# Patient Record
Sex: FEMALE | Race: OTHER | Employment: UNEMPLOYED | URBAN - METROPOLITAN AREA
[De-identification: names, ages, dates, MRNs, and addresses within clinical notes are randomized per-mention and may not be internally consistent; named-entity substitution may affect disease eponyms.]

---

## 2018-01-13 ENCOUNTER — HOSPITAL ENCOUNTER (OUTPATIENT)
Dept: MAMMOGRAPHY | Age: 43
Discharge: HOME | End: 2018-01-13
Attending: OBSTETRICS & GYNECOLOGY
Payer: COMMERCIAL

## 2018-01-13 DIAGNOSIS — Z12.39 SCREENING FOR BREAST CANCER: ICD-10-CM

## 2018-01-13 PROCEDURE — 77067 SCR MAMMO BI INCL CAD: CPT

## 2018-01-31 ENCOUNTER — HOSPITAL ENCOUNTER (OUTPATIENT)
Dept: MAMMOGRAPHY | Age: 43
Discharge: HOME | End: 2018-01-31
Attending: OBSTETRICS & GYNECOLOGY
Payer: COMMERCIAL

## 2018-01-31 DIAGNOSIS — R92.8 ABNORMAL SCREENING MAMMOGRAM: ICD-10-CM

## 2018-01-31 PROCEDURE — 77065 DX MAMMO INCL CAD UNI: CPT

## 2019-04-25 ENCOUNTER — HOSPITAL ENCOUNTER (OUTPATIENT)
Dept: MAMMOGRAPHY | Age: 44
Discharge: HOME | End: 2019-04-25
Attending: OBSTETRICS & GYNECOLOGY

## 2019-04-25 DIAGNOSIS — Z12.31 VISIT FOR SCREENING MAMMOGRAM: ICD-10-CM

## 2020-08-24 ENCOUNTER — HOSPITAL ENCOUNTER (OUTPATIENT)
Dept: MAMMOGRAPHY | Age: 45
Discharge: HOME | End: 2020-08-24
Attending: OBSTETRICS & GYNECOLOGY

## 2020-08-24 DIAGNOSIS — Z12.31 VISIT FOR SCREENING MAMMOGRAM: ICD-10-CM

## 2020-08-24 PROCEDURE — 77067 SCR MAMMO BI INCL CAD: CPT

## 2021-10-06 ENCOUNTER — TRANSCRIBE ORDER (OUTPATIENT)
Dept: SCHEDULING | Age: 46
End: 2021-10-06

## 2021-10-06 DIAGNOSIS — Z12.31 SCREENING MAMMOGRAM FOR HIGH-RISK PATIENT: Primary | ICD-10-CM

## 2021-11-09 ENCOUNTER — HOSPITAL ENCOUNTER (OUTPATIENT)
Dept: MAMMOGRAPHY | Age: 46
Discharge: HOME | End: 2021-11-09
Attending: OBSTETRICS & GYNECOLOGY

## 2021-11-09 DIAGNOSIS — Z12.31 SCREENING MAMMOGRAM FOR HIGH-RISK PATIENT: ICD-10-CM

## 2021-11-09 PROCEDURE — 77067 SCR MAMMO BI INCL CAD: CPT

## 2022-10-26 LAB
AVERAGE GLUCOSE: NORMAL
HBA1C MFR BLD: 5.4 %

## 2022-11-29 NOTE — PROGRESS NOTES
Marj Campo (:  1975) is a 50 y.o. female,New patient, here for evaluation of the following chief complaint(s):  New Patient (Patient wants discuss most recent labs results that was done on 10/26/2022./ Skin peeling on her right side of her head for about a month.)         ASSESSMENT/PLAN:  1. Hx of Hashimoto thyroiditis  -     TSH with Reflex; Future  -     CBC with Auto Differential; Future  2. Seborrhea  -     ketoconazole (NIZORAL) 2 % shampoo; Apply topically daily as needed. , Disp-1 each, R-3, Normal  3. Vitamin D deficiency  -     vitamin D (ERGOCALCIFEROL) 1.25 MG (25143 UT) CAPS capsule; Take 1 capsule by mouth once a week, Disp-12 capsule, R-3Normal  -     Vitamin D 25 Hydroxy; Future  1. Hashimoto's thyroiditis. Thyroid functions normal.  Will refer to endocrinologist once patient has insurance. 2.  Seborrhea will place on HydroVal shampoo. Advised head and shoulder shampoo. 3.  Vitamin D deficiency we will do replacement. Return in about 3 months (around 3/7/2023) for ffup with labs prior to visit. Subjective   SUBJECTIVE/OBJECTIVE:  67-year-old  female comes in to establish. Menses monthly x 5 days. LMP: 20 still with menses. Patient had lab work done in an outside facility which was reviewed. Medical illnesses include  1. History of Hashimoto's thyroiditis. Elevated TPO and thyroglobulin antibodies. Thyroid functions are normal.  2.  Complains of scaling in the front of her hairline. Has not used any antidandruff shampoos. Advised using head and shoulders shampoo. Also given prescription for ketoconazole shampoo. 3.  Lab work had shown vitamin D deficiency. We will do replacement and recheck in 3 months. 4.  Lab work 10/26/2022 hemoglobin A1c normal CBC normal CMP normal TSH normal CRP normal TPO and thyroglobulin antibodies positive      Review of Systems       Objective   Physical Exam  Constitutional:       Appearance: Normal appearance.  She is normal weight. HENT:      Head: Normocephalic. Neck:      Vascular: No carotid bruit. Cardiovascular:      Rate and Rhythm: Normal rate and regular rhythm. Pulses: Normal pulses. Heart sounds: Normal heart sounds. Pulmonary:      Effort: Pulmonary effort is normal.      Breath sounds: Normal breath sounds. Abdominal:      General: Abdomen is flat. Bowel sounds are normal.      Palpations: Abdomen is soft. Musculoskeletal:      Cervical back: No tenderness. Right lower leg: No edema. Left lower leg: No edema. Lymphadenopathy:      Cervical: No cervical adenopathy. Neurological:      General: No focal deficit present. Mental Status: She is alert. Psychiatric:         Mood and Affect: Mood normal.                An electronic signature was used to authenticate this note.     --Derrick Taylor MD

## 2022-12-07 ENCOUNTER — OFFICE VISIT (OUTPATIENT)
Dept: INTERNAL MEDICINE CLINIC | Facility: CLINIC | Age: 47
End: 2022-12-07

## 2022-12-07 VITALS
BODY MASS INDEX: 18.83 KG/M2 | TEMPERATURE: 98.6 F | HEART RATE: 72 BPM | SYSTOLIC BLOOD PRESSURE: 106 MMHG | WEIGHT: 113 LBS | DIASTOLIC BLOOD PRESSURE: 74 MMHG | OXYGEN SATURATION: 99 % | HEIGHT: 65 IN | RESPIRATION RATE: 14 BRPM

## 2022-12-07 DIAGNOSIS — L21.9 SEBORRHEA: ICD-10-CM

## 2022-12-07 DIAGNOSIS — E55.9 VITAMIN D DEFICIENCY: ICD-10-CM

## 2022-12-07 DIAGNOSIS — Z86.39 HX OF HASHIMOTO THYROIDITIS: Primary | ICD-10-CM

## 2022-12-07 PROCEDURE — 99204 OFFICE O/P NEW MOD 45 MIN: CPT | Performed by: FAMILY MEDICINE

## 2022-12-07 RX ORDER — ERGOCALCIFEROL 1.25 MG/1
50000 CAPSULE ORAL WEEKLY
Qty: 12 CAPSULE | Refills: 3 | Status: SHIPPED | OUTPATIENT
Start: 2022-12-07

## 2022-12-07 RX ORDER — KETOCONAZOLE 20 MG/ML
SHAMPOO TOPICAL
Qty: 1 EACH | Refills: 3 | Status: SHIPPED | OUTPATIENT
Start: 2022-12-07 | End: 2022-12-07

## 2022-12-07 RX ORDER — KETOCONAZOLE 20 MG/ML
SHAMPOO TOPICAL
Qty: 1 EACH | Refills: 3 | Status: SHIPPED | OUTPATIENT
Start: 2022-12-07

## 2022-12-07 SDOH — ECONOMIC STABILITY: FOOD INSECURITY: WITHIN THE PAST 12 MONTHS, YOU WORRIED THAT YOUR FOOD WOULD RUN OUT BEFORE YOU GOT MONEY TO BUY MORE.: NEVER TRUE

## 2022-12-07 SDOH — ECONOMIC STABILITY: FOOD INSECURITY: WITHIN THE PAST 12 MONTHS, THE FOOD YOU BOUGHT JUST DIDN'T LAST AND YOU DIDN'T HAVE MONEY TO GET MORE.: NEVER TRUE

## 2022-12-07 ASSESSMENT — PATIENT HEALTH QUESTIONNAIRE - PHQ9
DEPRESSION UNABLE TO ASSESS: PT REFUSES
SUM OF ALL RESPONSES TO PHQ QUESTIONS 1-9: 0
SUM OF ALL RESPONSES TO PHQ QUESTIONS 1-9: 0
1. LITTLE INTEREST OR PLEASURE IN DOING THINGS: 0
SUM OF ALL RESPONSES TO PHQ QUESTIONS 1-9: 0
SUM OF ALL RESPONSES TO PHQ9 QUESTIONS 1 & 2: 0
SUM OF ALL RESPONSES TO PHQ QUESTIONS 1-9: 0
2. FEELING DOWN, DEPRESSED OR HOPELESS: 0

## 2022-12-07 ASSESSMENT — SOCIAL DETERMINANTS OF HEALTH (SDOH): HOW HARD IS IT FOR YOU TO PAY FOR THE VERY BASICS LIKE FOOD, HOUSING, MEDICAL CARE, AND HEATING?: NOT HARD AT ALL

## 2023-02-02 ENCOUNTER — OFFICE VISIT (OUTPATIENT)
Dept: INTERNAL MEDICINE CLINIC | Facility: CLINIC | Age: 48
End: 2023-02-02
Payer: COMMERCIAL

## 2023-02-02 VITALS
HEART RATE: 72 BPM | BODY MASS INDEX: 19.49 KG/M2 | HEIGHT: 65 IN | WEIGHT: 117 LBS | SYSTOLIC BLOOD PRESSURE: 110 MMHG | DIASTOLIC BLOOD PRESSURE: 56 MMHG | OXYGEN SATURATION: 100 %

## 2023-02-02 DIAGNOSIS — L72.0 EPIDERMOID CYST OF SKIN OF BACK: ICD-10-CM

## 2023-02-02 DIAGNOSIS — L21.8 ACUTE GENERALIZED SEBORRHEIC DERMATITIS: Primary | ICD-10-CM

## 2023-02-02 PROCEDURE — 99213 OFFICE O/P EST LOW 20 MIN: CPT | Performed by: FAMILY MEDICINE

## 2023-02-02 ASSESSMENT — ENCOUNTER SYMPTOMS
COLOR CHANGE: 0
EYES NEGATIVE: 1
VOMITING: 0
NAUSEA: 0
RESPIRATORY NEGATIVE: 1
DIARRHEA: 0
WHEEZING: 0
CONSTIPATION: 0
SHORTNESS OF BREATH: 0

## 2023-02-02 NOTE — PROGRESS NOTES
Oumar Mcnally (:  1975) is a 50 y.o. female,Established patient, here for evaluation of the following chief complaint(s):  Referral - General (Would like derm referral for hair loss and dry skin. Also has a skin lesion on the back. )         ASSESSMENT/PLAN:  1. Acute generalized seborrheic dermatitis  -     [address] [facility]  2. Epidermoid cyst of skin of back  -     [address]    Return if symptoms worsen or fail to improve. 1) patient advised to keep using the zinc shampoo for the dandruff, and the SPF for the dryness and cerave night cream. Filed refereal to dermatology due to severity of the symptoms  2) Patient will seek surgical removal of the cyst.    Patient seen and examined with medical student. Assessment and plan discussed with medical student and patient. Subjective   SUBJECTIVE/OBJECTIVE:  Seborrheic Dermatitis: Dry itchy scalp was given ketoconazole shampoo. Began to have a reaction to the shampoo with increased dryness and redness around the scalp with swelling around the eyes after two uses. Dryness persisted after using shampoo and then began experiencing painful swelling around the eyes again 2 weeks after ceasing use of the shampoo as well as redness and dryness around the scalp line and corners of the mouth. She began using a psorasin ointment -2023. This relieved the patient's symptoms as well as zyrtec for ten days with a relief in symptoms. Patient has seen major improvement with scalp dryness since using Zinc shampoo since 2023.  Patient has solved face dryness since using SPF 50 sunscreen and cerave night cream.    Patient brought in images which reveal a similar appearance to seborrheic dermatitis with severe dryness and erythema    Epidermoid Cyst: Patient also notes to have enlarged bump on center of the back    No prior diagnosis of dermatological conditions      Review of Systems Constitutional:  Negative for chills, diaphoresis, fatigue and fever. HENT:  Negative for congestion and hearing loss. Eyes: Negative. Respiratory: Negative. Negative for shortness of breath and wheezing. Cardiovascular:  Negative for chest pain and palpitations. Gastrointestinal:  Negative for constipation, diarrhea, nausea and vomiting. Genitourinary: Negative. Musculoskeletal: Negative. Skin:  Negative for color change and rash. Mole on scalp line has begun to scale   Neurological:  Negative for dizziness, light-headedness and headaches. Psychiatric/Behavioral: Negative. Negative for dysphoric mood and sleep disturbance. The patient is not nervous/anxious. Objective   Physical Exam  Constitutional:       Appearance: Normal appearance. She is normal weight. HENT:      Head: Normocephalic and atraumatic. Nose: Nose normal.      Mouth/Throat:      Mouth: Mucous membranes are moist.   Eyes:      Extraocular Movements: Extraocular movements intact. Conjunctiva/sclera: Conjunctivae normal.      Pupils: Pupils are equal, round, and reactive to light. Cardiovascular:      Rate and Rhythm: Normal rate and regular rhythm. Pulses: Normal pulses. Heart sounds: Normal heart sounds. Pulmonary:      Effort: Pulmonary effort is normal.      Breath sounds: Normal breath sounds. Abdominal:      General: Abdomen is flat. Musculoskeletal:         General: Normal range of motion. Cervical back: Normal range of motion. Skin:     General: Skin is warm and dry. Findings: Erythema and rash present. Comments: Mild dryness and dandruff with scaling present around the scalp line    Possible epidermoid cyst on the patient mid-thoracic region   Neurological:      General: No focal deficit present. Mental Status: She is alert and oriented to person, place, and time. Mental status is at baseline.    Psychiatric:         Mood and Affect: Mood normal. Behavior: Behavior normal.            An electronic signature was used to authenticate this note.     --Goldie Dorsey MD

## 2023-03-01 DIAGNOSIS — Z86.39 HX OF HASHIMOTO THYROIDITIS: ICD-10-CM

## 2023-03-01 DIAGNOSIS — E55.9 VITAMIN D DEFICIENCY: ICD-10-CM

## 2023-03-01 LAB
BASOPHILS # BLD: 0 K/UL (ref 0–0.2)
BASOPHILS NFR BLD: 1 % (ref 0–2)
DIFFERENTIAL METHOD BLD: NORMAL
EOSINOPHIL # BLD: 0.1 K/UL (ref 0–0.8)
EOSINOPHIL NFR BLD: 2 % (ref 0.5–7.8)
ERYTHROCYTE [DISTWIDTH] IN BLOOD BY AUTOMATED COUNT: 14.2 % (ref 11.9–14.6)
HCT VFR BLD AUTO: 38.6 % (ref 35.8–46.3)
HGB BLD-MCNC: 12.4 G/DL (ref 11.7–15.4)
IMM GRANULOCYTES # BLD AUTO: 0 K/UL (ref 0–0.5)
IMM GRANULOCYTES NFR BLD AUTO: 0 % (ref 0–5)
LYMPHOCYTES # BLD: 1.7 K/UL (ref 0.5–4.6)
LYMPHOCYTES NFR BLD: 37 % (ref 13–44)
MCH RBC QN AUTO: 28.8 PG (ref 26.1–32.9)
MCHC RBC AUTO-ENTMCNC: 32.1 G/DL (ref 31.4–35)
MCV RBC AUTO: 89.6 FL (ref 82–102)
MONOCYTES # BLD: 0.5 K/UL (ref 0.1–1.3)
MONOCYTES NFR BLD: 10 % (ref 4–12)
NEUTS SEG # BLD: 2.3 K/UL (ref 1.7–8.2)
NEUTS SEG NFR BLD: 50 % (ref 43–78)
NRBC # BLD: 0 K/UL (ref 0–0.2)
PLATELET # BLD AUTO: 259 K/UL (ref 150–450)
PMV BLD AUTO: 10.7 FL (ref 9.4–12.3)
RBC # BLD AUTO: 4.31 M/UL (ref 4.05–5.2)
WBC # BLD AUTO: 4.7 K/UL (ref 4.3–11.1)

## 2023-03-02 LAB
25(OH)D3 SERPL-MCNC: 99.5 NG/ML (ref 30–100)
TSH W FREE THYROID IF ABNORMAL: 1.38 UIU/ML (ref 0.36–3.74)

## 2023-03-07 NOTE — PROGRESS NOTES
Kate Schwab (:  1975) is a 50 y.o. female,Established patient, here for evaluation of the following chief complaint(s):  Follow-up (3 month f/u- still having scalp issue but it is a little better. )         ASSESSMENT/PLAN:  1. Vitamin D deficiency  -     Vitamin D 25 Hydroxy; Future  2. Need for Tdap vaccination  -     Tetanus-Diphth-Acell Pertussis ([address]) 5-2.5-18.5 LF-MCG/0.5 injection; Inject 0.5 mLs into the muscle once for 1 dose, Disp-1 each, R-0Normal  3. Hx of Hashimoto thyroiditis  -     TSH with Reflex; Future  4. Epidermoid cyst of skin of back  5. Seborrhea  6. Screening for diabetes mellitus  -     Comprehensive Metabolic Panel; Future  -     Hemoglobin A1C; Future  -     Urinalysis with Reflex to Culture; Future  7. Screening, ischemic heart disease  -     CBC with Auto Differential; Future  -     Comprehensive Metabolic Panel; Future  -     Lipid Panel; Future  1. Vitamin D deficiency. Vitamin D level is in the normal range. Advised vitamin D over-the-counter supplementation for now. 2.  History of Hashimoto's. We will just recheck TSH. 3.  Epidermoid cyst of the back. Has appointment to surgeon. 4.  Seborrhea. Not any worse. Has appointment with dermatology. Not using ketoconazole shampoo for now. 5.  Schedule for complete physical exam on next visit with Pap smear. Return in about 33 weeks (around 10/30/2023) for ffup for cpe with labs prior. Subjective   SUBJECTIVE/OBJECTIVE:  78-year-old  female comes in for ffup. Menses monthly x 5 days. LMP: 20 still with menses. Patient had lab work done in an outside facility which was reviewed. Medical illnesses include  1. History of Hashimoto's thyroiditis. Elevated TPO and thyroglobulin antibodies. Thyroid functions are normal.  2.  Complains of scaling in the front of her hairline. Has not used any antidandruff shampoos. Advised using head and shoulders shampoo.   Also given prescription for ketoconazole shampoo. Has been referred to dermatologist.  3.  Lab work had shown vitamin D deficiency. We will do replacement and recheck in 3 months. 4.  Sebaceous cyst.  Referred to surgeon. Has appt in april 5. Lab work 10/26/2022 hemoglobin A1c normal CBC normal CMP normal TSH normal CRP normal TPO and thyroglobulin antibodies positive    Orders Only on 03/01/2023   Component Date Value Ref Range Status    WBC 03/01/2023 4.7  4.3 - 11.1 K/uL Final    RBC 03/01/2023 4.31  4.05 - 5.2 M/uL Final    Hemoglobin 03/01/2023 12.4  11.7 - 15.4 g/dL Final    Hematocrit 03/01/2023 38.6  35.8 - 46.3 % Final    MCV 03/01/2023 89.6  82 - 102 FL Final    MCH 03/01/2023 28.8  26.1 - 32.9 PG Final    MCHC 03/01/2023 32.1  31.4 - 35.0 g/dL Final    RDW 03/01/2023 14.2  11.9 - 14.6 % Final    Platelets 87/20/9152 259  150 - 450 K/uL Final    MPV 03/01/2023 10.7  9.4 - 12.3 FL Final    nRBC 03/01/2023 0.00  0.0 - 0.2 K/uL Final    **Note: Absolute NRBC parameter is now reported with Hemogram**    Differential Type 03/01/2023 AUTOMATED    Final    Seg Neutrophils 03/01/2023 50  43 - 78 % Final    Lymphocytes 03/01/2023 37  13 - 44 % Final    Monocytes 03/01/2023 10  4.0 - 12.0 % Final    Eosinophils % 03/01/2023 2  0.5 - 7.8 % Final    Basophils 03/01/2023 1  0.0 - 2.0 % Final    Immature Granulocytes 03/01/2023 0  0.0 - 5.0 % Final    Segs Absolute 03/01/2023 2.3  1.7 - 8.2 K/UL Final    Absolute Lymph # 03/01/2023 1.7  0.5 - 4.6 K/UL Final    Absolute Mono # 03/01/2023 0.5  0.1 - 1.3 K/UL Final    Absolute Eos # 03/01/2023 0.1  0.0 - 0.8 K/UL Final    Basophils Absolute 03/01/2023 0.0  0.0 - 0.2 K/UL Final    Absolute Immature Granulocyte 03/01/2023 0.0  0.0 - 0.5 K/UL Final    TSH w Free Thyroid if Abnormal 03/01/2023 1.38  0.358 - 3.740 UIU/ML Final    Vit D, 25-Hydroxy 03/01/2023 99.5  30.0 - 100.0 ng/mL Final       Review of Systems       Objective   Physical Exam  Constitutional:       Appearance: Normal appearance.  She is normal weight. Neck:      Vascular: No carotid bruit. Cardiovascular:      Rate and Rhythm: Normal rate and regular rhythm. Heart sounds: Normal heart sounds. Pulmonary:      Effort: Pulmonary effort is normal.      Breath sounds: Normal breath sounds. Abdominal:      General: Abdomen is flat. Bowel sounds are normal.      Palpations: Abdomen is soft. Musculoskeletal:      Cervical back: No tenderness. Right lower leg: No edema. Left lower leg: No edema. Lymphadenopathy:      Cervical: No cervical adenopathy. Neurological:      General: No focal deficit present. Mental Status: She is alert. Psychiatric:         Mood and Affect: Mood normal.                An electronic signature was used to authenticate this note.     --Robles Sanders MD

## 2023-03-13 ENCOUNTER — OFFICE VISIT (OUTPATIENT)
Dept: INTERNAL MEDICINE CLINIC | Facility: CLINIC | Age: 48
End: 2023-03-13
Payer: COMMERCIAL

## 2023-03-13 VITALS
SYSTOLIC BLOOD PRESSURE: 111 MMHG | OXYGEN SATURATION: 99 % | DIASTOLIC BLOOD PRESSURE: 61 MMHG | WEIGHT: 118 LBS | HEIGHT: 65 IN | BODY MASS INDEX: 19.66 KG/M2 | HEART RATE: 65 BPM

## 2023-03-13 DIAGNOSIS — E55.9 VITAMIN D DEFICIENCY: Primary | ICD-10-CM

## 2023-03-13 DIAGNOSIS — L72.0 EPIDERMOID CYST OF SKIN OF BACK: ICD-10-CM

## 2023-03-13 DIAGNOSIS — Z23 NEED FOR TDAP VACCINATION: ICD-10-CM

## 2023-03-13 DIAGNOSIS — Z13.6 SCREENING, ISCHEMIC HEART DISEASE: ICD-10-CM

## 2023-03-13 DIAGNOSIS — Z13.1 SCREENING FOR DIABETES MELLITUS: ICD-10-CM

## 2023-03-13 DIAGNOSIS — Z86.39 HX OF HASHIMOTO THYROIDITIS: ICD-10-CM

## 2023-03-13 DIAGNOSIS — L21.9 SEBORRHEA: ICD-10-CM

## 2023-03-13 PROCEDURE — 99214 OFFICE O/P EST MOD 30 MIN: CPT | Performed by: FAMILY MEDICINE

## 2023-03-13 SDOH — ECONOMIC STABILITY: FOOD INSECURITY: WITHIN THE PAST 12 MONTHS, THE FOOD YOU BOUGHT JUST DIDN'T LAST AND YOU DIDN'T HAVE MONEY TO GET MORE.: OFTEN TRUE

## 2023-03-13 SDOH — ECONOMIC STABILITY: HOUSING INSECURITY
IN THE LAST 12 MONTHS, WAS THERE A TIME WHEN YOU DID NOT HAVE A STEADY PLACE TO SLEEP OR SLEPT IN A SHELTER (INCLUDING NOW)?: NO

## 2023-03-13 SDOH — ECONOMIC STABILITY: FOOD INSECURITY: WITHIN THE PAST 12 MONTHS, YOU WORRIED THAT YOUR FOOD WOULD RUN OUT BEFORE YOU GOT MONEY TO BUY MORE.: SOMETIMES TRUE

## 2023-03-13 SDOH — ECONOMIC STABILITY: INCOME INSECURITY: HOW HARD IS IT FOR YOU TO PAY FOR THE VERY BASICS LIKE FOOD, HOUSING, MEDICAL CARE, AND HEATING?: SOMEWHAT HARD

## 2023-03-30 ENCOUNTER — APPOINTMENT (RX ONLY)
Dept: URBAN - METROPOLITAN AREA CLINIC 329 | Facility: CLINIC | Age: 48
Setting detail: DERMATOLOGY
End: 2023-03-30

## 2023-03-30 DIAGNOSIS — L72.8 OTHER FOLLICULAR CYSTS OF THE SKIN AND SUBCUTANEOUS TISSUE: ICD-10-CM

## 2023-03-30 DIAGNOSIS — H01.13 ECZEMATOUS DERMATITIS OF EYELID: ICD-10-CM | Status: INADEQUATELY CONTROLLED

## 2023-03-30 DIAGNOSIS — L21.8 OTHER SEBORRHEIC DERMATITIS: ICD-10-CM | Status: INADEQUATELY CONTROLLED

## 2023-03-30 PROBLEM — H01.139 ECZEMATOUS DERMATITIS OF UNSPECIFIED EYE, UNSPECIFIED EYELID: Status: ACTIVE | Noted: 2023-03-30

## 2023-03-30 PROBLEM — H01.134 ECZEMATOUS DERMATITIS OF LEFT UPPER EYELID: Status: ACTIVE | Noted: 2023-03-30

## 2023-03-30 PROCEDURE — ? COUNSELING

## 2023-03-30 PROCEDURE — ? FULL BODY SKIN EXAM - DECLINED

## 2023-03-30 PROCEDURE — 99204 OFFICE O/P NEW MOD 45 MIN: CPT

## 2023-03-30 PROCEDURE — ? PRESCRIPTION

## 2023-03-30 PROCEDURE — ? PRESCRIPTION MEDICATION MANAGEMENT

## 2023-03-30 RX ORDER — MOMETASONE FUROATE 1 MG/G
CREAM TOPICAL
Qty: 15 | Refills: 3 | Status: ERX | COMMUNITY
Start: 2023-03-30

## 2023-03-30 RX ORDER — CLOBETASOL PROPIONATE 0.5 MG/ML
SOLUTION TOPICAL
Qty: 50 | Refills: 4 | Status: ERX | COMMUNITY
Start: 2023-03-30

## 2023-03-30 RX ADMIN — CLOBETASOL PROPIONATE: 0.5 SOLUTION TOPICAL at 00:00

## 2023-03-30 RX ADMIN — MOMETASONE FUROATE: 1 CREAM TOPICAL at 00:00

## 2023-03-30 ASSESSMENT — LOCATION ZONE DERM
LOCATION ZONE: SCALP
LOCATION ZONE: FACE
LOCATION ZONE: TRUNK
LOCATION ZONE: EYELID

## 2023-03-30 ASSESSMENT — LOCATION SIMPLE DESCRIPTION DERM
LOCATION SIMPLE: UPPER BACK
LOCATION SIMPLE: RIGHT TEMPLE
LOCATION SIMPLE: LEFT SUPERIOR EYELID
LOCATION SIMPLE: LEFT TEMPLE
LOCATION SIMPLE: RIGHT EYEBROW
LOCATION SIMPLE: LEFT SCALP

## 2023-03-30 ASSESSMENT — LOCATION DETAILED DESCRIPTION DERM
LOCATION DETAILED: RIGHT INFERIOR TEMPLE
LOCATION DETAILED: LEFT MID TEMPLE
LOCATION DETAILED: INFERIOR THORACIC SPINE
LOCATION DETAILED: LEFT MEDIAL FRONTAL SCALP
LOCATION DETAILED: RIGHT CENTRAL EYEBROW
LOCATION DETAILED: LEFT MEDIAL SUPERIOR EYELID

## 2023-03-30 NOTE — PROCEDURE: MIPS QUALITY
Quality 394a: Meningococcal Immunizations For Adolescents: Patient had one dose of meningococcal vaccine (serogroups A, C, W, Y) on or between the patient's 11th and 13th birthdays.
Quality 394c: Hpv Vaccine For Adolescents: Patient has had at least two HPV vaccines (with at least 146 days between the two) OR three HPV vaccines on or between the patient’s 9th and 13th birthdays.
Quality 431: Preventive Care And Screening: Unhealthy Alcohol Use - Screening: Patient not identified as an unhealthy alcohol user when screened for unhealthy alcohol use using a systematic screening method
Detail Level: Detailed
Quality 110: Preventive Care And Screening: Influenza Immunization: Influenza Immunization Administered during Influenza season
Quality 394b: Td/Tdap Immunizations For Adolescents: Patient had one tetanus, diphtheria toxoids and acellular pertussis vaccine (Tdap) on or between the patient's 10th and 13th birthdays.
Quality 402: Tobacco Use And Help With Quitting Among Adolescents: Patient screened for tobacco and never smoked
Quality 130: Documentation Of Current Medications In The Medical Record: Current Medications Documented

## 2023-03-30 NOTE — PROCEDURE: REASSURANCE
Additional Notes (Optional): Advised pt that she can leave it as is if it doesn’t bother her but if she wants we can cut it out
Detail Level: Detailed
Hide Additional Notes?: No

## 2023-03-30 NOTE — PROCEDURE: PRESCRIPTION MEDICATION MANAGEMENT
Initiate Treatment: Mometasone bid for two week to have on hand when she gets flares
Detail Level: Zone
Continue Regimen: Cerave nighttime cream\\nZyrtec daily
Render In Strict Bullet Format?: No
Initiate Treatment: Clobetasol solution nightly
Continue Regimen: Pt is washing with zinc shampoo otc - just advised to let the shampoo sit for 3-5 min before washing it out

## 2023-05-04 ENCOUNTER — APPOINTMENT (RX ONLY)
Dept: URBAN - METROPOLITAN AREA CLINIC 329 | Facility: CLINIC | Age: 48
Setting detail: DERMATOLOGY
End: 2023-05-04

## 2023-05-04 DIAGNOSIS — L72.8 OTHER FOLLICULAR CYSTS OF THE SKIN AND SUBCUTANEOUS TISSUE: ICD-10-CM

## 2023-05-04 PROCEDURE — 11402 EXC TR-EXT B9+MARG 1.1-2 CM: CPT

## 2023-05-04 PROCEDURE — 12032 INTMD RPR S/A/T/EXT 2.6-7.5: CPT

## 2023-05-04 PROCEDURE — ? EXCISION

## 2023-05-04 PROCEDURE — ? COUNSELING

## 2023-05-04 ASSESSMENT — LOCATION DETAILED DESCRIPTION DERM
LOCATION DETAILED: RIGHT MEDIAL UPPER BACK
LOCATION DETAILED: SUPERIOR THORACIC SPINE

## 2023-05-04 ASSESSMENT — LOCATION SIMPLE DESCRIPTION DERM
LOCATION SIMPLE: RIGHT UPPER BACK
LOCATION SIMPLE: UPPER BACK

## 2023-05-04 ASSESSMENT — LOCATION ZONE DERM: LOCATION ZONE: TRUNK

## 2023-05-04 NOTE — PROCEDURE: EXCISION

## 2023-05-15 ENCOUNTER — APPOINTMENT (RX ONLY)
Dept: URBAN - METROPOLITAN AREA CLINIC 329 | Facility: CLINIC | Age: 48
Setting detail: DERMATOLOGY
End: 2023-05-15

## 2023-05-15 DIAGNOSIS — L72.8 OTHER FOLLICULAR CYSTS OF THE SKIN AND SUBCUTANEOUS TISSUE: ICD-10-CM

## 2023-05-15 PROCEDURE — ? SUTURE REMOVAL (NO GLOBAL PERIOD)

## 2023-05-15 ASSESSMENT — LOCATION ZONE DERM: LOCATION ZONE: TRUNK

## 2023-05-15 ASSESSMENT — LOCATION DETAILED DESCRIPTION DERM: LOCATION DETAILED: SUPERIOR THORACIC SPINE

## 2023-05-15 ASSESSMENT — LOCATION SIMPLE DESCRIPTION DERM: LOCATION SIMPLE: UPPER BACK

## 2023-09-21 ENCOUNTER — TRANSCRIBE ORDERS (OUTPATIENT)
Dept: SCHEDULING | Age: 48
End: 2023-09-21

## 2023-09-21 DIAGNOSIS — Z12.31 SCREENING MAMMOGRAM FOR HIGH-RISK PATIENT: Primary | ICD-10-CM

## 2023-10-03 ENCOUNTER — OFFICE VISIT (OUTPATIENT)
Dept: OBGYN CLINIC | Age: 48
End: 2023-10-03
Payer: COMMERCIAL

## 2023-10-03 VITALS — BODY MASS INDEX: 19.47 KG/M2 | WEIGHT: 117 LBS

## 2023-10-03 DIAGNOSIS — Z12.4 SCREENING FOR CERVICAL CANCER: ICD-10-CM

## 2023-10-03 DIAGNOSIS — Z01.419 WELL WOMAN EXAM WITH ROUTINE GYNECOLOGICAL EXAM: Primary | ICD-10-CM

## 2023-10-03 DIAGNOSIS — Z11.51 SCREENING FOR HPV (HUMAN PAPILLOMAVIRUS): ICD-10-CM

## 2023-10-03 PROCEDURE — 99386 PREV VISIT NEW AGE 40-64: CPT | Performed by: OBSTETRICS & GYNECOLOGY

## 2023-10-03 ASSESSMENT — ENCOUNTER SYMPTOMS
GASTROINTESTINAL NEGATIVE: 1
BLOOD IN STOOL: 0
COUGH: 0
ABDOMINAL PAIN: 0
ALLERGIC/IMMUNOLOGIC NEGATIVE: 1
RESPIRATORY NEGATIVE: 1
EYES NEGATIVE: 1
SHORTNESS OF BREATH: 0

## 2023-10-03 ASSESSMENT — PATIENT HEALTH QUESTIONNAIRE - PHQ9
2. FEELING DOWN, DEPRESSED OR HOPELESS: 0
1. LITTLE INTEREST OR PLEASURE IN DOING THINGS: 0
SUM OF ALL RESPONSES TO PHQ9 QUESTIONS 1 & 2: 0
SUM OF ALL RESPONSES TO PHQ QUESTIONS 1-9: 0

## 2023-10-04 ENCOUNTER — HOSPITAL ENCOUNTER (OUTPATIENT)
Dept: MAMMOGRAPHY | Age: 48
Discharge: HOME | End: 2023-10-07
Attending: FAMILY MEDICINE
Payer: COMMERCIAL

## 2023-10-04 VITALS — HEIGHT: 65 IN | WEIGHT: 117 LBS | BODY MASS INDEX: 19.49 KG/M2

## 2023-10-04 DIAGNOSIS — Z12.31 SCREENING MAMMOGRAM FOR HIGH-RISK PATIENT: ICD-10-CM

## 2023-10-04 PROCEDURE — 77067 SCR MAMMO BI INCL CAD: CPT

## 2023-10-07 LAB
CYTOLOGIST CVX/VAG CYTO: NORMAL
CYTOLOGY CVX/VAG DOC THIN PREP: NORMAL
HPV REFLEX: NORMAL
Lab: NORMAL
PATH REPORT.FINAL DX SPEC: NORMAL
STAT OF ADQ CVX/VAG CYTO-IMP: NORMAL

## 2023-10-31 ENCOUNTER — OFFICE VISIT (OUTPATIENT)
Dept: INTERNAL MEDICINE CLINIC | Facility: CLINIC | Age: 48
End: 2023-10-31
Payer: COMMERCIAL

## 2023-10-31 VITALS
BODY MASS INDEX: 19.93 KG/M2 | SYSTOLIC BLOOD PRESSURE: 116 MMHG | OXYGEN SATURATION: 100 % | HEIGHT: 65 IN | DIASTOLIC BLOOD PRESSURE: 63 MMHG | WEIGHT: 119.6 LBS | HEART RATE: 83 BPM

## 2023-10-31 DIAGNOSIS — Z12.11 SCREEN FOR COLON CANCER: ICD-10-CM

## 2023-10-31 DIAGNOSIS — Z23 NEED FOR SHINGLES VACCINE: ICD-10-CM

## 2023-10-31 DIAGNOSIS — L98.9 FACIAL LESION: ICD-10-CM

## 2023-10-31 DIAGNOSIS — Z23 NEED FOR TDAP VACCINATION: ICD-10-CM

## 2023-10-31 DIAGNOSIS — E55.9 VITAMIN D DEFICIENCY: Primary | ICD-10-CM

## 2023-10-31 DIAGNOSIS — Z86.39 HX OF HASHIMOTO THYROIDITIS: ICD-10-CM

## 2023-10-31 PROCEDURE — 99214 OFFICE O/P EST MOD 30 MIN: CPT | Performed by: FAMILY MEDICINE

## 2023-10-31 RX ORDER — ZOSTER VACCINE RECOMBINANT, ADJUVANTED 50 MCG/0.5
0.5 KIT INTRAMUSCULAR ONCE
Qty: 1 EACH | Refills: 0 | Status: SHIPPED | OUTPATIENT
Start: 2023-10-31 | End: 2023-10-31

## 2023-10-31 RX ORDER — ERGOCALCIFEROL 1.25 MG/1
50000 CAPSULE ORAL WEEKLY
Qty: 12 CAPSULE | Refills: 1 | Status: SHIPPED | OUTPATIENT
Start: 2023-10-31

## 2023-10-31 NOTE — PROGRESS NOTES
patients <25years of age. eGFR results are calculated without a race factor using  the 2021 CKD-EPI equation. Careful clinical correlation is recommended, particularly when comparing to results calculated using previous equations. The CKD-EPI equation is less accurate in patients with extremes of muscle mass, extra-renal metabolism of creatinine, excessive creatine ingestion, or following therapy that affects renal tubular secretion.       Calcium 10/24/2023 9.1  8.3 - 10.4 MG/DL Final    Total Bilirubin 10/24/2023 0.4  0.2 - 1.1 MG/DL Final    ALT 10/24/2023 24  12 - 65 U/L Final    AST 10/24/2023 19  15 - 37 U/L Final    Alk Phosphatase 10/24/2023 59  50 - 136 U/L Final    Total Protein 10/24/2023 7.5  6.3 - 8.2 g/dL Final    Albumin 10/24/2023 3.7  3.5 - 5.0 g/dL Final    Globulin 10/24/2023 3.8  2.8 - 4.5 g/dL Final    Albumin/Globulin Ratio 10/24/2023 1.0  0.4 - 1.6   Final    WBC 10/24/2023 5.6  4.3 - 11.1 K/uL Final    RBC 10/24/2023 4.08  4.05 - 5.2 M/uL Final    Hemoglobin 10/24/2023 11.5 (L)  11.7 - 15.4 g/dL Final    Hematocrit 10/24/2023 36.9  35.8 - 46.3 % Final    MCV 10/24/2023 90.4  82 - 102 FL Final    MCH 10/24/2023 28.2  26.1 - 32.9 PG Final    MCHC 10/24/2023 31.2 (L)  31.4 - 35.0 g/dL Final    RDW 10/24/2023 14.1  11.9 - 14.6 % Final    Platelets 05/09/0829 278  150 - 450 K/uL Final    MPV 10/24/2023 10.8  9.4 - 12.3 FL Final    nRBC 10/24/2023 0.00  0.0 - 0.2 K/uL Final    **Note: Absolute NRBC parameter is now reported with Hemogram**    Differential Type 10/24/2023 AUTOMATED    Final    Neutrophils % 10/24/2023 59  43 - 78 % Final    Lymphocytes % 10/24/2023 29  13 - 44 % Final    Monocytes % 10/24/2023 10  4.0 - 12.0 % Final    Eosinophils % 10/24/2023 2  0.5 - 7.8 % Final    Basophils % 10/24/2023 0  0.0 - 2.0 % Final    Immature Granulocytes 10/24/2023 0  0.0 - 5.0 % Final    Neutrophils Absolute 10/24/2023 3.3  1.7 - 8.2 K/UL Final    Lymphocytes Absolute 10/24/2023 1.6  0.5 - 4.6

## 2023-11-08 ENCOUNTER — APPOINTMENT (RX ONLY)
Dept: URBAN - METROPOLITAN AREA CLINIC 329 | Facility: CLINIC | Age: 48
Setting detail: DERMATOLOGY
End: 2023-11-08

## 2023-11-08 DIAGNOSIS — L82.0 INFLAMED SEBORRHEIC KERATOSIS: ICD-10-CM

## 2023-11-08 DIAGNOSIS — L81.4 OTHER MELANIN HYPERPIGMENTATION: ICD-10-CM

## 2023-11-08 DIAGNOSIS — L82.1 OTHER SEBORRHEIC KERATOSIS: ICD-10-CM

## 2023-11-08 PROCEDURE — ? BENIGN DESTRUCTION

## 2023-11-08 PROCEDURE — 17110 DESTRUCTION B9 LES UP TO 14: CPT

## 2023-11-08 PROCEDURE — ? SUNSCREEN RECOMMENDATIONS

## 2023-11-08 PROCEDURE — 99213 OFFICE O/P EST LOW 20 MIN: CPT | Mod: 25

## 2023-11-08 PROCEDURE — ? COUNSELING

## 2023-11-08 ASSESSMENT — LOCATION DETAILED DESCRIPTION DERM
LOCATION DETAILED: LEFT SUPERIOR CENTRAL MALAR CHEEK
LOCATION DETAILED: LEFT MID TEMPLE
LOCATION DETAILED: LEFT INFERIOR LATERAL MALAR CHEEK
LOCATION DETAILED: LEFT CENTRAL MALAR CHEEK
LOCATION DETAILED: RIGHT SUPERIOR CENTRAL MALAR CHEEK
LOCATION DETAILED: RIGHT SUPERIOR LATERAL BUCCAL CHEEK

## 2023-11-08 ASSESSMENT — LOCATION SIMPLE DESCRIPTION DERM
LOCATION SIMPLE: LEFT TEMPLE
LOCATION SIMPLE: RIGHT CHEEK
LOCATION SIMPLE: LEFT CHEEK

## 2023-11-08 ASSESSMENT — LOCATION ZONE DERM: LOCATION ZONE: FACE

## 2023-11-08 NOTE — PROCEDURE: BENIGN DESTRUCTION
Include Z78.9 (Other Specified Conditions Influencing Health Status) As An Associated Diagnosis?: No
Post-Care Instructions: I reviewed with the patient in detail post-care instructions. Patient is to wear sunprotection, and avoid picking at any of the treated lesions. Pt may apply Vaseline to crusted or scabbing areas.
Detail Level: Detailed
Treatment Number (Will Not Render If 0): 0
Anesthesia Volume In Cc: 0.5
Medical Necessity Information: It is in your best interest to select a reason for this procedure from the list below. All of these items fulfill various CMS LCD requirements except the new and changing color options.
Consent: The patient's consent was obtained including but not limited to risks of crusting, scabbing, blistering, scarring, darker or lighter pigmentary change, recurrence, incomplete removal and infection.
Medical Necessity Clause: This procedure was medically necessary because the lesions that were treated were:

## 2024-01-30 DIAGNOSIS — Z86.39 HX OF HASHIMOTO THYROIDITIS: ICD-10-CM

## 2024-01-30 DIAGNOSIS — E55.9 VITAMIN D DEFICIENCY: Primary | ICD-10-CM

## 2024-01-30 DIAGNOSIS — Z13.6 SCREENING, ISCHEMIC HEART DISEASE: ICD-10-CM

## 2024-01-30 DIAGNOSIS — Z13.1 SCREENING FOR DIABETES MELLITUS: ICD-10-CM

## 2024-03-08 ENCOUNTER — NURSE ONLY (OUTPATIENT)
Dept: INTERNAL MEDICINE CLINIC | Facility: CLINIC | Age: 49
End: 2024-03-08

## 2024-03-08 DIAGNOSIS — E55.9 VITAMIN D DEFICIENCY: ICD-10-CM

## 2024-03-08 DIAGNOSIS — Z13.6 SCREENING, ISCHEMIC HEART DISEASE: ICD-10-CM

## 2024-03-08 DIAGNOSIS — Z86.39 HX OF HASHIMOTO THYROIDITIS: ICD-10-CM

## 2024-03-08 DIAGNOSIS — Z13.1 SCREENING FOR DIABETES MELLITUS: ICD-10-CM

## 2024-03-08 LAB
25(OH)D3 SERPL-MCNC: 108.4 NG/ML (ref 30–100)
ALBUMIN SERPL-MCNC: 3.9 G/DL (ref 3.5–5)
ALBUMIN/GLOB SERPL: 1 (ref 0.4–1.6)
ALP SERPL-CCNC: 53 U/L (ref 50–136)
ALT SERPL-CCNC: 31 U/L (ref 12–65)
ANION GAP SERPL CALC-SCNC: 4 MMOL/L (ref 2–11)
AST SERPL-CCNC: 28 U/L (ref 15–37)
BASOPHILS # BLD: 0 K/UL (ref 0–0.2)
BASOPHILS NFR BLD: 1 % (ref 0–2)
BILIRUB SERPL-MCNC: 0.6 MG/DL (ref 0.2–1.1)
BUN SERPL-MCNC: 17 MG/DL (ref 6–23)
CALCIUM SERPL-MCNC: 8.9 MG/DL (ref 8.3–10.4)
CHLORIDE SERPL-SCNC: 107 MMOL/L (ref 103–113)
CHOLEST SERPL-MCNC: 200 MG/DL
CO2 SERPL-SCNC: 28 MMOL/L (ref 21–32)
CREAT SERPL-MCNC: 0.6 MG/DL (ref 0.6–1)
DIFFERENTIAL METHOD BLD: ABNORMAL
EOSINOPHIL # BLD: 0.1 K/UL (ref 0–0.8)
EOSINOPHIL NFR BLD: 2 % (ref 0.5–7.8)
ERYTHROCYTE [DISTWIDTH] IN BLOOD BY AUTOMATED COUNT: 15.5 % (ref 11.9–14.6)
EST. AVERAGE GLUCOSE BLD GHB EST-MCNC: 108 MG/DL
GLOBULIN SER CALC-MCNC: 3.8 G/DL (ref 2.8–4.5)
GLUCOSE SERPL-MCNC: 80 MG/DL (ref 65–100)
HBA1C MFR BLD: 5.4 % (ref 4.8–5.6)
HCT VFR BLD AUTO: 39.7 % (ref 35.8–46.3)
HDLC SERPL-MCNC: 75 MG/DL (ref 40–60)
HDLC SERPL: 2.7
HGB BLD-MCNC: 12.2 G/DL (ref 11.7–15.4)
IMM GRANULOCYTES # BLD AUTO: 0 K/UL (ref 0–0.5)
IMM GRANULOCYTES NFR BLD AUTO: 0 % (ref 0–5)
LDLC SERPL CALC-MCNC: 117.6 MG/DL
LYMPHOCYTES # BLD: 1.3 K/UL (ref 0.5–4.6)
LYMPHOCYTES NFR BLD: 31 % (ref 13–44)
MCH RBC QN AUTO: 26.8 PG (ref 26.1–32.9)
MCHC RBC AUTO-ENTMCNC: 30.7 G/DL (ref 31.4–35)
MCV RBC AUTO: 87.1 FL (ref 82–102)
MONOCYTES # BLD: 0.4 K/UL (ref 0.1–1.3)
MONOCYTES NFR BLD: 10 % (ref 4–12)
NEUTS SEG # BLD: 2.4 K/UL (ref 1.7–8.2)
NEUTS SEG NFR BLD: 57 % (ref 43–78)
NRBC # BLD: 0 K/UL (ref 0–0.2)
PLATELET # BLD AUTO: 277 K/UL (ref 150–450)
PMV BLD AUTO: 10.7 FL (ref 9.4–12.3)
POTASSIUM SERPL-SCNC: 3.9 MMOL/L (ref 3.5–5.1)
PROT SERPL-MCNC: 7.7 G/DL (ref 6.3–8.2)
RBC # BLD AUTO: 4.56 M/UL (ref 4.05–5.2)
SODIUM SERPL-SCNC: 139 MMOL/L (ref 136–146)
TRIGL SERPL-MCNC: 37 MG/DL (ref 35–150)
TSH, 3RD GENERATION: 1.29 UIU/ML (ref 0.36–3.74)
VLDLC SERPL CALC-MCNC: 7.4 MG/DL (ref 6–23)
WBC # BLD AUTO: 4.2 K/UL (ref 4.3–11.1)

## 2024-03-12 NOTE — PROGRESS NOTES
Absolute 03/08/2024 0.0  0.0 - 0.2 K/UL Final    Absolute Immature Granulocyte 03/08/2024 0.0  0.0 - 0.5 K/UL Final    Vit D, 25-Hydroxy 03/08/2024 108.4 (H)  30.0 - 100.0 ng/mL Final         Review of Systems       Objective   Physical Exam  Constitutional:       Appearance: Normal appearance.   HENT:      Head: Normocephalic.   Neck:      Vascular: No carotid bruit.   Cardiovascular:      Rate and Rhythm: Normal rate and regular rhythm.      Heart sounds: Normal heart sounds.   Pulmonary:      Effort: Pulmonary effort is normal.      Breath sounds: Normal breath sounds.   Abdominal:      General: Abdomen is flat. Bowel sounds are normal.      Palpations: Abdomen is soft.   Musculoskeletal:      Cervical back: No tenderness.   Lymphadenopathy:      Cervical: No cervical adenopathy.   Skin:     Comments: Some thinning of hair on top of head   Neurological:      General: No focal deficit present.      Mental Status: She is alert.   Psychiatric:         Mood and Affect: Mood normal.                  An electronic signature was used to authenticate this note.    --Aravind Hampton MD

## 2024-03-13 ENCOUNTER — OFFICE VISIT (OUTPATIENT)
Dept: INTERNAL MEDICINE CLINIC | Facility: CLINIC | Age: 49
End: 2024-03-13
Payer: COMMERCIAL

## 2024-03-13 VITALS
BODY MASS INDEX: 19.66 KG/M2 | HEART RATE: 69 BPM | TEMPERATURE: 98.1 F | HEIGHT: 65 IN | DIASTOLIC BLOOD PRESSURE: 62 MMHG | SYSTOLIC BLOOD PRESSURE: 122 MMHG | OXYGEN SATURATION: 100 % | WEIGHT: 118 LBS

## 2024-03-13 DIAGNOSIS — Z13.1 SCREENING FOR DIABETES MELLITUS: ICD-10-CM

## 2024-03-13 DIAGNOSIS — L65.9 HAIR LOSS: ICD-10-CM

## 2024-03-13 DIAGNOSIS — Z86.39 HX OF HASHIMOTO THYROIDITIS: ICD-10-CM

## 2024-03-13 DIAGNOSIS — E55.9 VITAMIN D DEFICIENCY: Primary | ICD-10-CM

## 2024-03-13 DIAGNOSIS — Z13.6 SCREENING, ISCHEMIC HEART DISEASE: ICD-10-CM

## 2024-03-13 PROCEDURE — 99214 OFFICE O/P EST MOD 30 MIN: CPT | Performed by: FAMILY MEDICINE

## 2024-06-13 ENCOUNTER — PREP FOR PROCEDURE (OUTPATIENT)
Dept: SURGERY | Age: 49
End: 2024-06-13

## 2024-06-13 DIAGNOSIS — Z12.11 SPECIAL SCREENING FOR MALIGNANT NEOPLASMS, COLON: ICD-10-CM

## 2024-06-27 RX ORDER — SODIUM CHLORIDE 0.9 % (FLUSH) 0.9 %
5-40 SYRINGE (ML) INJECTION EVERY 12 HOURS SCHEDULED
Status: CANCELLED | OUTPATIENT
Start: 2024-06-27

## 2024-06-27 RX ORDER — SODIUM CHLORIDE 9 MG/ML
INJECTION, SOLUTION INTRAVENOUS PRN
Status: CANCELLED | OUTPATIENT
Start: 2024-06-27

## 2024-06-27 RX ORDER — SODIUM CHLORIDE 0.9 % (FLUSH) 0.9 %
5-40 SYRINGE (ML) INJECTION PRN
Status: CANCELLED | OUTPATIENT
Start: 2024-06-27

## 2024-07-13 PROBLEM — Z12.11 SPECIAL SCREENING FOR MALIGNANT NEOPLASMS, COLON: Status: RESOLVED | Noted: 2024-06-13 | Resolved: 2024-07-13

## 2024-07-15 ENCOUNTER — TELEPHONE (OUTPATIENT)
Dept: SURGERY | Age: 49
End: 2024-07-15